# Patient Record
Sex: MALE | Race: WHITE
[De-identification: names, ages, dates, MRNs, and addresses within clinical notes are randomized per-mention and may not be internally consistent; named-entity substitution may affect disease eponyms.]

---
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
## 2021-04-21 ENCOUNTER — HOSPITAL ENCOUNTER (OUTPATIENT)
Dept: HOSPITAL 92 - CSHULT | Age: 21
Discharge: HOME | End: 2021-04-21
Attending: FAMILY MEDICINE
Payer: COMMERCIAL

## 2021-04-21 DIAGNOSIS — N50.819: Primary | ICD-10-CM

## 2021-04-21 PROCEDURE — 93976 VASCULAR STUDY: CPT

## 2021-04-21 PROCEDURE — 76870 US EXAM SCROTUM: CPT

## 2021-09-08 ENCOUNTER — HOSPITAL ENCOUNTER (OUTPATIENT)
Dept: HOSPITAL 92 - ULT | Age: 21
Discharge: HOME | End: 2021-09-08
Attending: UROLOGY
Payer: COMMERCIAL

## 2021-09-08 DIAGNOSIS — R94.4: ICD-10-CM

## 2021-09-08 DIAGNOSIS — R10.30: ICD-10-CM

## 2021-09-08 DIAGNOSIS — N50.811: Primary | ICD-10-CM

## 2021-09-08 PROCEDURE — 76999 ECHO EXAMINATION PROCEDURE: CPT

## 2021-09-08 PROCEDURE — 76770 US EXAM ABDO BACK WALL COMP: CPT
